# Patient Record
Sex: FEMALE | Race: WHITE | NOT HISPANIC OR LATINO
[De-identification: names, ages, dates, MRNs, and addresses within clinical notes are randomized per-mention and may not be internally consistent; named-entity substitution may affect disease eponyms.]

---

## 2019-05-02 PROBLEM — Z00.00 ENCOUNTER FOR PREVENTIVE HEALTH EXAMINATION: Status: ACTIVE | Noted: 2019-05-02

## 2019-05-10 ENCOUNTER — APPOINTMENT (OUTPATIENT)
Dept: OTOLARYNGOLOGY | Facility: CLINIC | Age: 64
End: 2019-05-10
Payer: COMMERCIAL

## 2019-05-10 PROCEDURE — V5299A: CUSTOM | Mod: NC

## 2019-12-18 ENCOUNTER — APPOINTMENT (OUTPATIENT)
Dept: OTOLARYNGOLOGY | Facility: CLINIC | Age: 64
End: 2019-12-18
Payer: SELF-PAY

## 2019-12-18 PROCEDURE — 92593: CPT | Mod: NC

## 2020-10-02 ENCOUNTER — APPOINTMENT (OUTPATIENT)
Dept: OTOLARYNGOLOGY | Facility: CLINIC | Age: 65
End: 2020-10-02
Payer: COMMERCIAL

## 2020-10-02 ENCOUNTER — APPOINTMENT (OUTPATIENT)
Dept: OTOLARYNGOLOGY | Facility: CLINIC | Age: 65
End: 2020-10-02
Payer: SELF-PAY

## 2020-10-02 VITALS — WEIGHT: 158 LBS | BODY MASS INDEX: 26.33 KG/M2 | HEIGHT: 65 IN | TEMPERATURE: 97.9 F

## 2020-10-02 DIAGNOSIS — Z80.3 FAMILY HISTORY OF MALIGNANT NEOPLASM OF BREAST: ICD-10-CM

## 2020-10-02 DIAGNOSIS — Z86.39 PERSONAL HISTORY OF OTHER ENDOCRINE, NUTRITIONAL AND METABOLIC DISEASE: ICD-10-CM

## 2020-10-02 DIAGNOSIS — H90.3 SENSORINEURAL HEARING LOSS, BILATERAL: ICD-10-CM

## 2020-10-02 DIAGNOSIS — Z86.69 PERSONAL HISTORY OF OTHER DISEASES OF THE NERVOUS SYSTEM AND SENSE ORGANS: ICD-10-CM

## 2020-10-02 PROCEDURE — 92567 TYMPANOMETRY: CPT

## 2020-10-02 PROCEDURE — 92593: CPT | Mod: NC

## 2020-10-02 PROCEDURE — 92557 COMPREHENSIVE HEARING TEST: CPT

## 2020-10-02 PROCEDURE — 99213 OFFICE O/P EST LOW 20 MIN: CPT

## 2020-10-02 RX ORDER — NORTRIPTYLINE HYDROCHLORIDE 75 MG/1
CAPSULE ORAL
Refills: 0 | Status: ACTIVE | COMMUNITY

## 2020-10-02 NOTE — HISTORY OF PRESENT ILLNESS
[de-identified] : BECKIE PEMBERTON is a 64 year patient With a long history of bilateral sensorineural hearing loss. She is here for her annual exam. She has no otalgia or otorrhea. She uses hearing aids. She had reflux in the past. That has been a lot better. She has no history of of recurrent ear infections or prior otologic surgery

## 2020-10-02 NOTE — CONSULT LETTER
[Dear  ___] : Dear  [unfilled], [Courtesy Letter:] : I had the pleasure of seeing your patient, [unfilled], in my office today. [Please see my note below.] : Please see my note below. [Consult Closing:] : Thank you very much for allowing me to participate in the care of this patient.  If you have any questions, please do not hesitate to contact me. [Sincerely,] : Sincerely, [FreeTextEntry3] : Bhargavi Jaramillo MD\par

## 2020-10-02 NOTE — ASSESSMENT
[FreeTextEntry1] : She has a history of bilateral sensorineural hearing loss which is stable. Her ears were normal on exam\par \par PLAN\par \par -findings and management options discussed in detail with the patient. \par -good aural hygiene\par -annual audiogram\par -She has an appointment with audiology to have her hearing aids checked\par -follow up in one year\par -call and return earlier if any concerns. \par

## 2020-12-17 ENCOUNTER — NON-APPOINTMENT (OUTPATIENT)
Age: 65
End: 2020-12-17

## 2020-12-21 ENCOUNTER — APPOINTMENT (OUTPATIENT)
Dept: OTOLARYNGOLOGY | Facility: CLINIC | Age: 65
End: 2020-12-21
Payer: SELF-PAY

## 2020-12-21 PROCEDURE — 92593: CPT | Mod: NC

## 2021-01-04 ENCOUNTER — APPOINTMENT (OUTPATIENT)
Dept: OTOLARYNGOLOGY | Facility: CLINIC | Age: 66
End: 2021-01-04
Payer: SELF-PAY

## 2021-01-04 PROCEDURE — V5014E: CUSTOM

## 2021-10-06 ENCOUNTER — APPOINTMENT (OUTPATIENT)
Dept: OTOLARYNGOLOGY | Facility: CLINIC | Age: 66
End: 2021-10-06
Payer: SELF-PAY

## 2021-10-06 DIAGNOSIS — Z46.1 ENCOUNTER FOR FITTING AND ADJUSTMENT OF HEARING AID: ICD-10-CM

## 2021-10-06 PROCEDURE — 92593: CPT | Mod: NC

## 2022-01-26 ENCOUNTER — NON-APPOINTMENT (OUTPATIENT)
Age: 67
End: 2022-01-26

## 2022-01-26 ENCOUNTER — APPOINTMENT (OUTPATIENT)
Dept: OTOLARYNGOLOGY | Facility: CLINIC | Age: 67
End: 2022-01-26
Payer: MEDICARE

## 2022-01-26 ENCOUNTER — TRANSCRIPTION ENCOUNTER (OUTPATIENT)
Age: 67
End: 2022-01-26

## 2022-01-26 DIAGNOSIS — K21.9 GASTRO-ESOPHAGEAL REFLUX DISEASE W/OUT ESOPHAGITIS: ICD-10-CM

## 2022-01-26 DIAGNOSIS — H90.3 SENSORINEURAL HEARING LOSS, BILATERAL: ICD-10-CM

## 2022-01-26 PROCEDURE — 99213 OFFICE O/P EST LOW 20 MIN: CPT

## 2022-01-26 PROCEDURE — 92557 COMPREHENSIVE HEARING TEST: CPT

## 2022-01-26 PROCEDURE — 92567 TYMPANOMETRY: CPT

## 2022-01-26 RX ORDER — VIT C/E/ZN/COPPR/LUTEIN/ZEAXAN 250MG-90MG
CAPSULE ORAL
Refills: 0 | Status: ACTIVE | COMMUNITY

## 2022-01-26 RX ORDER — CHROMIUM 200 MCG
TABLET ORAL
Refills: 0 | Status: ACTIVE | COMMUNITY

## 2022-01-26 NOTE — HISTORY OF PRESENT ILLNESS
[de-identified] : BECKIE PEMBERTON is a 66 year patient with a history of bilateral sensorineural hearing loss.  She uses hearing aids. She has been doing well. She has no otalgia or otorrhea.  She also has a history of reflux. She follows dietary precautions. She has no throat pain, dysphagia, hoarseness or bleeding. She also has posterior neck and shoulder discomfort. She thinks it may be from sitting in front of the computer. . \par \par ENT History\par no history of recurrent ear infections or prior otologic surgery

## 2022-01-26 NOTE — ASSESSMENT
[FreeTextEntry1] : She has a history of bilateral sensorineural hearing loss. Her audiogram shows essentially stable hearing\par \par She has reflux\par \par She has should and posterior neck pain which is likely musculoskeletal in nature\par \par PLAN\par \par -findings and management options discussed in detail with the patient. \par -good aural hygiene\par -annual audiogram\par -She will see the audiologist as needed to have her hearing aids checked\par -reflux precautions. OTC medication as needed\par -GI evaluation to rule out esophageal changes. She declined flexible laryngoscopy today\par -warm compresses/heating pad, massage and ibuprofen as needed for the neck pain. I also recommended physiatry evaluation with Dr. Olmos\par -follow up in one year if doing well\par -call and return earlier if any concerns. \par

## 2022-02-02 ENCOUNTER — APPOINTMENT (OUTPATIENT)
Dept: PHYSICAL MEDICINE AND REHAB | Facility: CLINIC | Age: 67
End: 2022-02-02
Payer: MEDICARE

## 2022-02-02 VITALS
WEIGHT: 154 LBS | HEART RATE: 84 BPM | DIASTOLIC BLOOD PRESSURE: 90 MMHG | BODY MASS INDEX: 25.66 KG/M2 | RESPIRATION RATE: 18 BRPM | HEIGHT: 65 IN | SYSTOLIC BLOOD PRESSURE: 134 MMHG

## 2022-02-02 DIAGNOSIS — M79.10 MYALGIA, UNSPECIFIED SITE: ICD-10-CM

## 2022-02-02 DIAGNOSIS — M62.89 OTHER SPECIFIED DISORDERS OF MUSCLE: ICD-10-CM
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
## 2022-02-02 DIAGNOSIS — M79.18 MYALGIA, OTHER SITE: ICD-10-CM

## 2022-02-02 DIAGNOSIS — G89.29 PAIN IN RIGHT ARM: ICD-10-CM

## 2022-02-02 DIAGNOSIS — M79.601 PAIN IN RIGHT ARM: ICD-10-CM

## 2022-02-02 DIAGNOSIS — M79.9 SOFT TISSUE DISORDER, UNSPECIFIED: ICD-10-CM

## 2022-02-02 DIAGNOSIS — M54.2 CERVICALGIA: ICD-10-CM

## 2022-02-02 DIAGNOSIS — G89.29 CERVICALGIA: ICD-10-CM

## 2022-02-02 PROCEDURE — 99204 OFFICE O/P NEW MOD 45 MIN: CPT

## 2022-02-02 NOTE — ASSESSMENT
[FreeTextEntry1] : \par PLAN AND RECOMMENDATIONS :\par \par We discussed differential diagnosis and clinical impression\par this is chronic RSI or cumulative strain disorder \par she has not had any Rx \par discussed \par rehab advised as well as ergonomic education \par The patient was given handouts to read on this condition,helpful hints ,exercises etc \par all questions answered\par \par Recommend\par .symptomatic care and support\par  medications NSAIDS as needed -  OTC fine (-personal preference )-(once or twice a day), -warned of  possible GI side effects -advised to take with meals or add over the counter Nexium, if sensitive\par \par  imaging not needed clinical diagnosis \par  referral to PT posture biomechanics advice on device placement mouse etc as well as taking breaks etc \par needs therex for improving upper back strength \par  hydrotherapy /heat / cold for pain\par  continue  ergonomic precautions including pacing ,posture and frequent breaks while typing.\par \par  relative rest and avoidance of painful activity where possible \par  increasing activity as discussed \par  return for follow up 6 weeks \par

## 2022-02-02 NOTE — PHYSICAL EXAM
[FreeTextEntry1] : PHYSICAL EXAM : OBJECTIVE \par \par GENERAL : Awake ,alert and oriented to time place and person \par HEAD : normocephalic and atraumatic \par NECK : supple ,no tracheal deviation ,no thyroid enlargement noted with swallowing\par EYES : sclera and conjunctiva normal no redness,intact extraocular movements \par ENT  : ears and nose normal in appearance -hearing adequate \par PULMONARY: effort normal. No respiratory distress. breathing regular. No wheezes \par LYMPH : No swelling in limbs, capillary return within normal range \par CVS : warm extremities,no palpitations,not short of breath, no visible jugular venous distention\par PSYCH : mood and affect normal ,good eye contact ,normal attention \par ABDOMEN : no visible distension , \par NEUROLOGICAL:cranial nerves intact,muscle tone normal,gait and balance safe except where noted below \par SKIN : warm and dry No rash detected over specific body areas examined \par MUSCULOSKELETAL: normal muscle bulk, no focal bony tenderness /posture normal except where specified below\par R arm full ROM shoulder \par C spine full range \par No long tract signs found on clinical exam and no focal neurological deficits\par tender long head of biceps \par head forward 2 + finger breadths \par MMT 5/5\par no numbness sensation intact \par gait NL \par no atrophy

## 2022-02-02 NOTE — CONSULT LETTER
[FreeTextEntry1] : Dear Dr. SHAWN KELSEY  , Dr BARBOSA \par \par I had the pleasure of evaluating your patient, ANNE PEMBERTON .\par \par Thank you very much for allowing me to participate in the care of this patient. If you have any questions, please do not hesitate to contact me. \par \par Sincerely, \par Britt Castillo MD \par \par ABPMR Board Certified in Physical Medicine and Rehabilitation\par Certified Fellow of AANEM (Neuromuscular and Electrodiagnostic Medicine)\par Subspecialty certified in Sports Medicine (ABPMR)\par \par  of Physical Medicine and Rehabilitation\par [employer] [facility]\par [facility]\par \par

## 2022-02-02 NOTE — HISTORY OF PRESENT ILLNESS
[FreeTextEntry1] : Ms. ANNE PEMBERTON is a very pleasant 66 year female seen for evaluation of neck pain radiating down R arm  that has been ongoing for one and half years  -getting worst  without any specific injury or inciting event.she thinks its from the computer  The pain is located primarily R shoulder and forearm  intermittent in nature and described as achy . The pain is rated as 6/10 during today's visit, and ranges from 3-9/10. 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 The patient denies any other radiating symptoms to the upper extremities, numbness/tingling, weakness, or bowel/bladder dysfunction. The patient has no other complaints at this time.\par she is R handed \par she is mildly deaf -had to speak louder \par she has not had any Rx

## 2022-02-02 NOTE — REVIEW OF SYSTEMS
[Patient Intake Form Reviewed] : Patient intake form was reviewed [Lower Ext Edema] : lower extremity edema [Muscle Pain] : muscle pain [Negative] : Heme/Lymph [Fever] : no fever [Recent Change In Weight] : ~T no recent weight change [Muscle Weakness] : no muscle weakness

## 2022-05-17 ENCOUNTER — APPOINTMENT (OUTPATIENT)
Dept: PHYSICAL MEDICINE AND REHAB | Facility: CLINIC | Age: 67
End: 2022-05-17
Payer: MEDICARE

## 2022-05-17 VITALS
BODY MASS INDEX: 25.83 KG/M2 | WEIGHT: 155 LBS | SYSTOLIC BLOOD PRESSURE: 136 MMHG | RESPIRATION RATE: 18 BRPM | DIASTOLIC BLOOD PRESSURE: 80 MMHG | HEART RATE: 87 BPM | HEIGHT: 65 IN

## 2022-05-17 DIAGNOSIS — S86.912A STRAIN OF UNSPECIFIED MUSCLE(S) AND TENDON(S) AT LOWER LEG LEVEL, LEFT LEG, INITIAL ENCOUNTER: ICD-10-CM

## 2022-05-17 DIAGNOSIS — M23.8X2 OTHER INTERNAL DERANGEMENTS OF LEFT KNEE: ICD-10-CM

## 2022-05-17 DIAGNOSIS — M25.462 EFFUSION, LEFT KNEE: ICD-10-CM

## 2022-05-17 DIAGNOSIS — M17.10 UNILATERAL PRIMARY OSTEOARTHRITIS, UNSPECIFIED KNEE: ICD-10-CM

## 2022-05-17 PROCEDURE — 99214 OFFICE O/P EST MOD 30 MIN: CPT

## 2022-05-17 RX ORDER — NAPROXEN 500 MG/1
500 TABLET ORAL
Qty: 60 | Refills: 0 | Status: ACTIVE | COMMUNITY
Start: 2022-05-17 | End: 1900-01-01

## 2022-05-17 NOTE — PHYSICAL EXAM
[FreeTextEntry1] : PHYSICAL EXAM : OBJECTIVE \par \par GENERAL : Awake ,alert and oriented to time place and person \par HEAD : normocephalic and atraumatic \par NECK : supple ,no tracheal deviation ,no thyroid enlargement noted with swallowing\par EYES : sclera and conjunctiva normal no redness,intact extraocular movements \par ENT  : ears and nose normal in appearance -hearing adequate \par PULMONARY: effort normal. No respiratory distress. breathing regular. No wheezes \par LYMPH : No swelling in limbs, capillary return within normal range \par CVS : warm extremities,no palpitations,not short of breath, no visible jugular venous distention\par PSYCH : mood and affect normal ,good eye contact ,normal attention \par ABDOMEN : no visible distension , \par NEUROLOGICAL:cranial nerves intact,muscle tone normal,gait and balance safe except where noted below \par SKIN : warm and dry No rash detected over specific body areas examined \par MUSCULOSKELETAL: normal muscle bulk, no focal bony tenderness /posture normal except where specified below\par tender med joint line \par ROM full left knee 100 to minus 5 \par R knee mild knee flex contracture \par gait NL \par crepitus both knees\par mild atrophy left quads \par hips ok

## 2022-05-17 NOTE — HISTORY OF PRESENT ILLNESS
[FreeTextEntry1] : Ms. ANNE PEMBERTON is a very pleasant 66 year female who seen for evaluation of left knee pain  that has been ongoing for about a month  without any specific injury or inciting event. feels she twisted the knee and sprained it -applied ice riight away which helped The pain is located primarily prox med knee  intermittent in nature and described as achy- dull to sharp  . The pain is rated as 4/10 during today's visit, and ranges from 2-7/10. The patient's symptoms are aggravated by biking   and alleviated by ice and rest  . The patient denies any night pain, numbness/tingling, weakness, or bowel/bladder dysfunction. The patient has no other complaints at this time.\par she has been attending PT for R arm shoulder neck and is doing better \par she works as an  \par walking ok\par pain has been slowly getting better \par

## 2022-05-17 NOTE — ASSESSMENT
[FreeTextEntry1] : \par PLAN AND RECOMMENDATIONS :\par \par We discussed differential diagnosis and clinical impression\par this is a strain resolving with underlying OA \par advise rel rest and 2 weeks on nsaids to calm inflamm \par \par Recommend\par .symptomatic care and support\par  medications NSAIDS as needed - naprosyn 500 mg  twice a day), -warned of  possible GI side effects -advised to take with meals or add over the counter Nexium, if sensitive\par \par  imaging not needed yet \par \par  hydrotherapy /heat / cold for pain\par  continue  joint precautions including care with bending, lifting, twisting and  carrying.\par \par  relative rest and avoidance of painful activity where possible \par  increasing activity as discussed \par  return for follow up 4 weeks if not resolved \par will get imaging at that point \par

## 2022-05-17 NOTE — REVIEW OF SYSTEMS
[Patient Intake Form Reviewed] : Patient intake form was reviewed [Joint Pain] : joint pain [Joint Stiffness] : joint stiffness [Negative] : Heme/Lymph [Lower Ext Edema] : no lower extremity edema [Muscle Pain] : no muscle pain [Muscle Weakness] : no muscle weakness [Difficulty Walking] : no difficulty walking

## 2022-05-17 NOTE — CONSULT LETTER
[FreeTextEntry1] : Dear Dr. SHAWN KELSEY  , \par \par I had the pleasure of re evaluating your patient, ANNE PEMBERTON . She presented with a new problem \par \par Thank you very much for allowing me to participate in the care of this patient. If you have any questions, please do not hesitate to contact me. \par \par Sincerely, \par Britt Castillo MD \par \par ABPMR Board Certified in Physical Medicine and Rehabilitation\par Certified Fellow of AANEM (Neuromuscular and Electrodiagnostic Medicine)\par Subspecialty certified in Sports Medicine (ABPMR)\par \par  of Physical Medicine and Rehabilitation\par [facility]\par [facility]\par \par

## 2022-08-30 ENCOUNTER — NON-APPOINTMENT (OUTPATIENT)
Age: 67
End: 2022-08-30

## 2022-09-07 ENCOUNTER — APPOINTMENT (OUTPATIENT)
Dept: OTOLARYNGOLOGY | Facility: CLINIC | Age: 67
End: 2022-09-07

## 2022-09-07 PROCEDURE — 92593: CPT | Mod: NC

## 2023-01-27 ENCOUNTER — APPOINTMENT (OUTPATIENT)
Dept: OTOLARYNGOLOGY | Facility: CLINIC | Age: 68
End: 2023-01-27
Payer: SELF-PAY

## 2023-01-27 PROCEDURE — 92593: CPT | Mod: NC

## 2023-04-21 ENCOUNTER — APPOINTMENT (OUTPATIENT)
Dept: OTOLARYNGOLOGY | Facility: CLINIC | Age: 68
End: 2023-04-21
Payer: SELF-PAY

## 2023-04-21 PROCEDURE — 92593: CPT | Mod: NC

## 2023-05-15 ENCOUNTER — APPOINTMENT (OUTPATIENT)
Dept: OTOLARYNGOLOGY | Facility: CLINIC | Age: 68
End: 2023-05-15
Payer: SELF-PAY

## 2023-05-15 PROCEDURE — 92592: CPT | Mod: NC

## 2023-05-22 ENCOUNTER — APPOINTMENT (OUTPATIENT)
Dept: OTOLARYNGOLOGY | Facility: CLINIC | Age: 68
End: 2023-05-22
Payer: SELF-PAY

## 2023-05-22 DIAGNOSIS — Z71.89 OTHER SPECIFIED COUNSELING: ICD-10-CM

## 2023-05-22 PROCEDURE — V5014G: CUSTOM

## 2024-08-16 ENCOUNTER — APPOINTMENT (OUTPATIENT)
Dept: OTOLARYNGOLOGY | Facility: CLINIC | Age: 69
End: 2024-08-16

## 2024-08-19 ENCOUNTER — APPOINTMENT (OUTPATIENT)
Dept: OTOLARYNGOLOGY | Facility: CLINIC | Age: 69
End: 2024-08-19
Payer: MEDICARE

## 2024-08-19 VITALS — HEIGHT: 65 IN | BODY MASS INDEX: 25.83 KG/M2 | WEIGHT: 155 LBS

## 2024-08-19 DIAGNOSIS — H90.3 SENSORINEURAL HEARING LOSS, BILATERAL: ICD-10-CM

## 2024-08-19 DIAGNOSIS — K21.9 GASTRO-ESOPHAGEAL REFLUX DISEASE W/OUT ESOPHAGITIS: ICD-10-CM

## 2024-08-19 PROCEDURE — 99213 OFFICE O/P EST LOW 20 MIN: CPT

## 2024-08-19 PROCEDURE — 92557 COMPREHENSIVE HEARING TEST: CPT

## 2024-08-19 PROCEDURE — 92567 TYMPANOMETRY: CPT

## 2024-08-19 RX ORDER — ATORVASTATIN CALCIUM 80 MG/1
TABLET, FILM COATED ORAL
Refills: 0 | Status: ACTIVE | COMMUNITY

## 2024-08-19 NOTE — HISTORY OF PRESENT ILLNESS
[de-identified] : ANNE PEMBERTON is a 68 year old patient Here for follow-up of bilateral sensorineural hearing loss.  She uses hearing aids.  She has been doing well.  She has no complaints   ENT history She has bilateral sensorineural hearing loss and uses hearing aids No history of recurrent ear infections or prior otologic surgery She has a history of reflux.  It has been well-controlled

## 2024-08-19 NOTE — ASSESSMENT
[FreeTextEntry1] : She has bilateral sensorineural hearing loss.  It is stable.  We reviewed her audiogram  Plan -Findings and management options were discussed with the patient. - Continue good ear hygiene - Annual audiogram - She will follow-up with the audiologist as needed - Continue reflux precautions - Follow-up in 1 year or earlier if needed

## 2024-09-16 ENCOUNTER — APPOINTMENT (OUTPATIENT)
Dept: OTOLARYNGOLOGY | Facility: CLINIC | Age: 69
End: 2024-09-16

## 2024-10-14 ENCOUNTER — APPOINTMENT (OUTPATIENT)
Dept: OTOLARYNGOLOGY | Facility: CLINIC | Age: 69
End: 2024-10-14
Payer: SELF-PAY

## 2024-10-14 PROCEDURE — 92593: CPT | Mod: NC

## 2025-07-08 ENCOUNTER — NON-APPOINTMENT (OUTPATIENT)
Age: 70
End: 2025-07-08

## 2025-07-09 ENCOUNTER — APPOINTMENT (OUTPATIENT)
Dept: OTOLARYNGOLOGY | Facility: CLINIC | Age: 70
End: 2025-07-09
Payer: SELF-PAY

## 2025-07-09 PROCEDURE — 92593: CPT | Mod: NC
